# Patient Record
Sex: FEMALE | Race: WHITE | Employment: UNEMPLOYED | URBAN - METROPOLITAN AREA
[De-identification: names, ages, dates, MRNs, and addresses within clinical notes are randomized per-mention and may not be internally consistent; named-entity substitution may affect disease eponyms.]

---

## 2019-03-10 ENCOUNTER — APPOINTMENT (OUTPATIENT)
Dept: MRI IMAGING | Age: 44
End: 2019-03-10
Attending: EMERGENCY MEDICINE
Payer: OTHER GOVERNMENT

## 2019-03-10 ENCOUNTER — APPOINTMENT (OUTPATIENT)
Dept: GENERAL RADIOLOGY | Age: 44
End: 2019-03-10
Attending: EMERGENCY MEDICINE
Payer: OTHER GOVERNMENT

## 2019-03-10 ENCOUNTER — HOSPITAL ENCOUNTER (EMERGENCY)
Age: 44
Discharge: HOME | End: 2019-03-10
Attending: EMERGENCY MEDICINE | Admitting: EMERGENCY MEDICINE
Payer: OTHER GOVERNMENT

## 2019-03-10 ENCOUNTER — APPOINTMENT (OUTPATIENT)
Dept: CT IMAGING | Age: 44
End: 2019-03-10
Attending: EMERGENCY MEDICINE
Payer: OTHER GOVERNMENT

## 2019-03-10 VITALS
HEIGHT: 64 IN | RESPIRATION RATE: 14 BRPM | SYSTOLIC BLOOD PRESSURE: 132 MMHG | TEMPERATURE: 98.4 F | WEIGHT: 155 LBS | OXYGEN SATURATION: 100 % | DIASTOLIC BLOOD PRESSURE: 78 MMHG | BODY MASS INDEX: 26.46 KG/M2 | HEART RATE: 72 BPM

## 2019-03-10 DIAGNOSIS — R29.898 ARM WEAKNESS: ICD-10-CM

## 2019-03-10 DIAGNOSIS — R20.2 PARESTHESIA: Primary | ICD-10-CM

## 2019-03-10 DIAGNOSIS — G93.0 ARACHNOID CYST: ICD-10-CM

## 2019-03-10 LAB
AMPHET UR QL SCN: NEGATIVE
ANION GAP SERPL CALC-SCNC: 7 MMOL/L (ref 3–18)
APPEARANCE UR: CLEAR
APTT PPP: 25.4 SEC (ref 23–36.4)
BARBITURATES UR QL SCN: NEGATIVE
BASOPHILS # BLD: 0 K/UL (ref 0–0.1)
BASOPHILS NFR BLD: 0 % (ref 0–2)
BENZODIAZ UR QL: NEGATIVE
BILIRUB UR QL: NEGATIVE
BUN SERPL-MCNC: 15 MG/DL (ref 7–18)
BUN/CREAT SERPL: 18 (ref 12–20)
CALCIUM SERPL-MCNC: 8.7 MG/DL (ref 8.5–10.1)
CANNABINOIDS UR QL SCN: NEGATIVE
CHLORIDE SERPL-SCNC: 111 MMOL/L (ref 100–108)
CK MB CFR SERPL CALC: 2.3 % (ref 0–4)
CK MB SERPL-MCNC: 2.3 NG/ML (ref 5–25)
CK SERPL-CCNC: 100 U/L (ref 26–192)
CO2 SERPL-SCNC: 22 MMOL/L (ref 21–32)
COCAINE UR QL SCN: NEGATIVE
COLOR UR: YELLOW
CREAT SERPL-MCNC: 0.82 MG/DL (ref 0.6–1.3)
DIFFERENTIAL METHOD BLD: ABNORMAL
EOSINOPHIL # BLD: 0.2 K/UL (ref 0–0.4)
EOSINOPHIL NFR BLD: 2 % (ref 0–5)
ERYTHROCYTE [DISTWIDTH] IN BLOOD BY AUTOMATED COUNT: 12.5 % (ref 11.6–14.5)
ETHANOL SERPL-MCNC: 8 MG/DL (ref 0–3)
GLUCOSE BLD STRIP.AUTO-MCNC: 85 MG/DL (ref 70–110)
GLUCOSE SERPL-MCNC: 86 MG/DL (ref 74–99)
GLUCOSE UR STRIP.AUTO-MCNC: NEGATIVE MG/DL
HCG UR QL: NEGATIVE
HCT VFR BLD AUTO: 38.2 % (ref 35–45)
HDSCOM,HDSCOM: NORMAL
HGB BLD-MCNC: 13.3 G/DL (ref 12–16)
HGB UR QL STRIP: NEGATIVE
INR PPP: 0.9 (ref 0.8–1.2)
KETONES UR QL STRIP.AUTO: NEGATIVE MG/DL
LEUKOCYTE ESTERASE UR QL STRIP.AUTO: NEGATIVE
LYMPHOCYTES # BLD: 2.9 K/UL (ref 0.9–3.6)
LYMPHOCYTES NFR BLD: 34 % (ref 21–52)
MCH RBC QN AUTO: 31.8 PG (ref 24–34)
MCHC RBC AUTO-ENTMCNC: 34.8 G/DL (ref 31–37)
MCV RBC AUTO: 91.4 FL (ref 74–97)
METHADONE UR QL: NEGATIVE
MONOCYTES # BLD: 0.6 K/UL (ref 0.05–1.2)
MONOCYTES NFR BLD: 7 % (ref 3–10)
NEUTS SEG # BLD: 4.8 K/UL (ref 1.8–8)
NEUTS SEG NFR BLD: 57 % (ref 40–73)
NITRITE UR QL STRIP.AUTO: NEGATIVE
OPIATES UR QL: NEGATIVE
PCP UR QL: NEGATIVE
PH UR STRIP: 6 [PH] (ref 5–8)
PLATELET # BLD AUTO: 294 K/UL (ref 135–420)
PMV BLD AUTO: 9 FL (ref 9.2–11.8)
POTASSIUM SERPL-SCNC: 3.9 MMOL/L (ref 3.5–5.5)
PROT UR STRIP-MCNC: NEGATIVE MG/DL
PROTHROMBIN TIME: 12.3 SEC (ref 11.5–15.2)
RBC # BLD AUTO: 4.18 M/UL (ref 4.2–5.3)
SODIUM SERPL-SCNC: 140 MMOL/L (ref 136–145)
SP GR UR REFRACTOMETRY: 1.02 (ref 1–1.03)
TROPONIN I SERPL-MCNC: <0.02 NG/ML (ref 0–0.04)
UROBILINOGEN UR QL STRIP.AUTO: 0.2 EU/DL (ref 0.2–1)
WBC # BLD AUTO: 8.5 K/UL (ref 4.6–13.2)

## 2019-03-10 PROCEDURE — 71045 X-RAY EXAM CHEST 1 VIEW: CPT

## 2019-03-10 PROCEDURE — 96361 HYDRATE IV INFUSION ADD-ON: CPT

## 2019-03-10 PROCEDURE — 82962 GLUCOSE BLOOD TEST: CPT

## 2019-03-10 PROCEDURE — 70450 CT HEAD/BRAIN W/O DYE: CPT

## 2019-03-10 PROCEDURE — 74011250637 HC RX REV CODE- 250/637: Performed by: EMERGENCY MEDICINE

## 2019-03-10 PROCEDURE — 99285 EMERGENCY DEPT VISIT HI MDM: CPT

## 2019-03-10 PROCEDURE — 81003 URINALYSIS AUTO W/O SCOPE: CPT

## 2019-03-10 PROCEDURE — A9575 INJ GADOTERATE MEGLUMI 0.1ML: HCPCS | Performed by: EMERGENCY MEDICINE

## 2019-03-10 PROCEDURE — 80048 BASIC METABOLIC PNL TOTAL CA: CPT

## 2019-03-10 PROCEDURE — 70553 MRI BRAIN STEM W/O & W/DYE: CPT

## 2019-03-10 PROCEDURE — 93005 ELECTROCARDIOGRAM TRACING: CPT

## 2019-03-10 PROCEDURE — 74011636320 HC RX REV CODE- 636/320: Performed by: EMERGENCY MEDICINE

## 2019-03-10 PROCEDURE — 85730 THROMBOPLASTIN TIME PARTIAL: CPT

## 2019-03-10 PROCEDURE — 96360 HYDRATION IV INFUSION INIT: CPT

## 2019-03-10 PROCEDURE — 85610 PROTHROMBIN TIME: CPT

## 2019-03-10 PROCEDURE — 74011250636 HC RX REV CODE- 250/636: Performed by: EMERGENCY MEDICINE

## 2019-03-10 PROCEDURE — 81025 URINE PREGNANCY TEST: CPT

## 2019-03-10 PROCEDURE — 80307 DRUG TEST PRSMV CHEM ANLYZR: CPT

## 2019-03-10 PROCEDURE — 85025 COMPLETE CBC W/AUTO DIFF WBC: CPT

## 2019-03-10 PROCEDURE — 82550 ASSAY OF CK (CPK): CPT

## 2019-03-10 RX ORDER — ARIPIPRAZOLE 15 MG/1
15 TABLET ORAL DAILY
COMMUNITY

## 2019-03-10 RX ORDER — TOPIRAMATE 25 MG/1
75 TABLET ORAL 3 TIMES DAILY
COMMUNITY

## 2019-03-10 RX ORDER — ATORVASTATIN CALCIUM 20 MG/1
40 TABLET, FILM COATED ORAL DAILY
Status: DISCONTINUED | OUTPATIENT
Start: 2019-03-11 | End: 2019-03-10

## 2019-03-10 RX ORDER — ESCITALOPRAM OXALATE 10 MG/1
20 TABLET ORAL DAILY
COMMUNITY

## 2019-03-10 RX ORDER — ASPIRIN 325 MG
325 TABLET ORAL
Status: COMPLETED | OUTPATIENT
Start: 2019-03-10 | End: 2019-03-10

## 2019-03-10 RX ORDER — LAMOTRIGINE 150 MG/1
300 TABLET ORAL DAILY
COMMUNITY

## 2019-03-10 RX ORDER — MODAFINIL 200 MG/1
250 TABLET ORAL DAILY
COMMUNITY

## 2019-03-10 RX ORDER — BUPROPION HYDROCHLORIDE 300 MG/1
300 TABLET ORAL
COMMUNITY

## 2019-03-10 RX ORDER — SODIUM CHLORIDE 9 MG/ML
125 INJECTION, SOLUTION INTRAVENOUS CONTINUOUS
Status: DISCONTINUED | OUTPATIENT
Start: 2019-03-10 | End: 2019-03-10 | Stop reason: HOSPADM

## 2019-03-10 RX ORDER — BUPROPION HYDROCHLORIDE 150 MG/1
150 TABLET ORAL
COMMUNITY

## 2019-03-10 RX ADMIN — GADOTERATE MEGLUMINE 15 ML: 376.9 INJECTION INTRAVENOUS at 17:11

## 2019-03-10 RX ADMIN — ASPIRIN 325 MG ORAL TABLET 325 MG: 325 PILL ORAL at 16:31

## 2019-03-10 RX ADMIN — SODIUM CHLORIDE 125 ML/HR: 900 INJECTION, SOLUTION INTRAVENOUS at 17:31

## 2019-03-10 NOTE — ED PROVIDER NOTES
EMERGENCY DEPARTMENT HISTORY AND PHYSICAL EXAM    Date: 3/10/2019  Patient Name: Bernardo Sewell    History of Presenting Illness     Chief Complaint   Patient presents with    Extremity Weakness         History Provided By: Patient    Chief Complaint: slurred speech, right facial droop  Duration: 1 hour and 40 minutes ago  Timing:  Acute  Location: right face  Modifying Factors: none  Associated Symptoms: generalized paresthesias, weakness    Additional History (Context):   3:11 PM   Bernardo Sewell is a 37 y.o. female who presents to the emergency department C/O slurred speech and left facial droop noted by her  1 hour and 40 minutes ago. Associated sxs include generalized paresthesias, and left sided weakness of left upper and lower extremities. There are no known modifying factors. Pt denies any pain, and any other sxs or complaints. PCP: Keely, MD Marely        Past History     Past Medical History:  Past Medical History:   Diagnosis Date    Bipolar 1 disorder ([facility]     Borderline personality disorder ([facility]     Migraine     Psychiatric disorder        Past Surgical History:  History reviewed. No pertinent surgical history. Family History:  History reviewed. No pertinent family history. Social History:  Social History     Tobacco Use    Smoking status: Never Smoker    Smokeless tobacco: Never Used   Substance Use Topics    Alcohol use: No     Frequency: Never    Drug use: No       Allergies: Allergies   Allergen Reactions    Codeine Nausea and Vomiting    Zithromax [Azithromycin] Other (comments)     \"eye swelling\"         Review of Systems   Review of Systems   Cardiovascular: Negative for chest pain. Gastrointestinal: Negative for abdominal pain. Musculoskeletal: Negative for myalgias. Neurological: Positive for facial asymmetry (right droop), speech difficulty (slurred), weakness (left extremities) and numbness (generalized paresthesias ).    All other systems reviewed and are negative. Physical Exam     Vitals:    03/10/19 1519 03/10/19 1545 03/10/19 1600   BP: 135/84 117/81 132/78   Pulse: 79 72    Resp: 16 14    Temp: 98.4 °F (36.9 °C)     SpO2: 100% 100% 100%   Weight: 70.3 kg (155 lb)     Height: 5' 4\" (1.626 m)       Physical Exam    CONSTITUTIONAL: Alert, well-developed; well-nourished. HEAD:  Normocephalic, atraumatic  EYES: PERRL; EOM's intact. ENTM: Nose: no rhinorrhea; Throat: no erythema or exudate, mucous membranes moist  Neck:  No JVD, supple without lymphadenopathy  RESP: Chest clear, equal breath sounds. CV: S1 and S2 WNL; No murmurs, gallops or rubs. GI: Normal bowel sounds, abdomen soft and non-tender. No masses or organomegaly. : No costo-vertebral angle tenderness. BACK:  Non-tender  UPPER EXT:  Normal inspection  LOWER EXT: No edema, no calf tenderness. Distal pulses intact. NEURO: Reflexes 2/4 and sym, left upper extremity with extensor weakness (+4/5), but flexor strength is +5/5. She has slight left lower extremity weakness (4/5), and her right lower extremity strength is 5/5. CN intact except for slight droop to the corner of the mouth on the right. Sensation intact. SKIN: normal for age and stage. PSYCH:  Alert and oriented, normal affect. Diagnostic Study Results     Labs -     Recent Results (from the past 12 hour(s))   GLUCOSE, POC    Collection Time: 03/10/19  3:15 PM   Result Value Ref Range    Glucose (POC) 85 70 - 110 mg/dL   CBC WITH AUTOMATED DIFF    Collection Time: 03/10/19  3:25 PM   Result Value Ref Range    WBC 8.5 4.6 - 13.2 K/uL    RBC 4.18 (L) 4.20 - 5.30 M/uL    HGB 13.3 12.0 - 16.0 g/dL    HCT 38.2 35.0 - 45.0 %    MCV 91.4 74.0 - 97.0 FL    MCH 31.8 24.0 - 34.0 PG    MCHC 34.8 31.0 - 37.0 g/dL    RDW 12.5 11.6 - 14.5 %    PLATELET 238 903 - 914 K/uL    MPV 9.0 (L) 9.2 - 11.8 FL    NEUTROPHILS 57 40 - 73 %    LYMPHOCYTES 34 21 - 52 %    MONOCYTES 7 3 - 10 %    EOSINOPHILS 2 0 - 5 %    BASOPHILS 0 0 - 2 %    ABS. NEUTROPHILS 4.8 1.8 - 8.0 K/UL    ABS. LYMPHOCYTES 2.9 0.9 - 3.6 K/UL    ABS. MONOCYTES 0.6 0.05 - 1.2 K/UL    ABS. EOSINOPHILS 0.2 0.0 - 0.4 K/UL    ABS.  BASOPHILS 0.0 0.0 - 0.1 K/UL    DF AUTOMATED     PROTHROMBIN TIME + INR    Collection Time: 03/10/19  3:25 PM   Result Value Ref Range    Prothrombin time 12.3 11.5 - 15.2 sec    INR 0.9 0.8 - 1.2     PTT    Collection Time: 03/10/19  3:25 PM   Result Value Ref Range    aPTT 25.4 23.0 - 36.4 SEC   CARDIAC PANEL,(CK, CKMB & TROPONIN)    Collection Time: 03/10/19  3:25 PM   Result Value Ref Range     26 - 192 U/L    CK - MB 2.3 <3.6 ng/ml    CK-MB Index 2.3 0.0 - 4.0 %    Troponin-I, QT <0.02 0.0 - 0.408 NG/ML   METABOLIC PANEL, BASIC    Collection Time: 03/10/19  3:25 PM   Result Value Ref Range    Sodium 140 136 - 145 mmol/L    Potassium 3.9 3.5 - 5.5 mmol/L    Chloride 111 (H) 100 - 108 mmol/L    CO2 22 21 - 32 mmol/L    Anion gap 7 3.0 - 18 mmol/L    Glucose 86 74 - 99 mg/dL    BUN 15 7.0 - 18 MG/DL    Creatinine 0.82 0.6 - 1.3 MG/DL    BUN/Creatinine ratio 18 12 - 20      GFR est AA >60 >60 ml/min/1.73m2    GFR est non-AA >60 >60 ml/min/1.73m2    Calcium 8.7 8.5 - 10.1 MG/DL   ETHYL ALCOHOL    Collection Time: 03/10/19  3:25 PM   Result Value Ref Range    ALCOHOL(ETHYL),SERUM 8 (H) 0 - 3 MG/DL   EKG, 12 LEAD, INITIAL    Collection Time: 03/10/19  3:49 PM   Result Value Ref Range    Ventricular Rate 69 BPM    Atrial Rate 69 BPM    P-R Interval 144 ms    QRS Duration 90 ms    Q-T Interval 402 ms    QTC Calculation (Bezet) 430 ms    Calculated P Axis 60 degrees    Calculated R Axis 54 degrees    Calculated T Axis 32 degrees    Diagnosis       Normal sinus rhythm  Normal ECG  No previous ECGs available     URINALYSIS W/ RFLX MICROSCOPIC    Collection Time: 03/10/19  3:55 PM   Result Value Ref Range    Color YELLOW      Appearance CLEAR      Specific gravity 1.019 1.005 - 1.030      pH (UA) 6.0 5.0 - 8.0      Protein NEGATIVE  NEG mg/dL    Glucose NEGATIVE NEG mg/dL    Ketone NEGATIVE  NEG mg/dL    Bilirubin NEGATIVE  NEG      Blood NEGATIVE  NEG      Urobilinogen 0.2 0.2 - 1.0 EU/dL    Nitrites NEGATIVE  NEG      Leukocyte Esterase NEGATIVE  NEG     DRUG SCREEN, URINE    Collection Time: 03/10/19  3:55 PM   Result Value Ref Range    BENZODIAZEPINES NEGATIVE  NEG      BARBITURATES NEGATIVE  NEG      THC (TH-CANNABINOL) NEGATIVE  NEG      OPIATES NEGATIVE  NEG      PCP(PHENCYCLIDINE) NEGATIVE  NEG      COCAINE NEGATIVE  NEG      AMPHETAMINES NEGATIVE  NEG      METHADONE NEGATIVE  NEG      HDSCOM (NOTE)    HCG URINE, QL    Collection Time: 03/10/19  3:55 PM   Result Value Ref Range    HCG urine, QL NEGATIVE  NEG         Radiologic Studies -     7:01 PM  RADIOLOGY FINDINGS  Chest X-ray shows no acute process  Pending review by Radiologist  Recorded by Jose David Stern ED Scribe, as dictated by Keturah Castillo MD     MRI BRAIN W WO CONT   Final Result   IMPRESSION:      1. No evidence of acute infarct or other acute or significant intracranial   finding. 2. Incidental small supracerebellar arachnoid cyst.      As read by the radiologist.    CT HEAD WO CONT   Final Result   IMPRESSION:      No acute intracranial abnormalities. Critical result called to  Physician: Bri Nuno at 3:39 PM on March   10, 2019. This result was acknowledged and understood. As read by the radiologist.    XR CHEST PORT    (Results Pending)     CT Results  (Last 48 hours)               03/10/19 1532  CT HEAD WO CONT Final result    Impression:  IMPRESSION:       No acute intracranial abnormalities. Critical result called to Dr. Byrd: Bri Nuno at 3:39 PM on March   10, 2019. This result was acknowledged and understood. Narrative:  EXAM: CT head       INDICATION: Left facial droop and left arm weakness       COMPARISON: None.        TECHNIQUE: Axial CT imaging of the head was performed without intravenous   contrast. One or more dose reduction techniques were used on this CT: automated   exposure control, adjustment of the mAs and/or kVp according to patient size,   and iterative reconstruction techniques. The specific techniques used on this   CT exam have been documented in the patient's electronic medical record. Digital   Imaging and Communications in Medicine (DICOM) format image data are available   to nonaffiliated external healthcare facilities or entities on a secure, media   free, reciprocally searchable basis with patient authorization for at least a   12-month period after this study. _______________       FINDINGS:       BRAIN AND POSTERIOR FOSSA: The sulci, folia, ventricles and basal cisterns are   within normal limits for the patient's age. There is no intracranial hemorrhage,   mass effect, or midline shift. There are no areas of abnormal parenchymal   attenuation. EXTRA-AXIAL SPACES AND MENINGES: There are no abnormal extra-axial fluid   collections. CALVARIUM: Intact. SINUSES: Clear. OTHER: None.       _______________               CXR Results  (Last 48 hours)    None          Medications given in the ED-  Medications   0.9% sodium chloride infusion (125 mL/hr IntraVENous New Bag 3/10/19 1731)   aspirin tablet 325 mg (325 mg Oral Given 3/10/19 1631)   gadoterate meglumine (DOTAREM) 0.5 mmol/mL contrast solution syringe 15 mL (15 mL IntraVENous Given 3/10/19 1711)         Medical Decision Making   I am the first provider for this patient. I reviewed the vital signs, available nursing notes, past medical history, past surgical history, family history and social history. Vital Signs-Reviewed the patient's vital signs. Pulse Oximetry Analysis - 100% on room air     ,EKG interpretation: (Preliminary)  3:49 PM   NSR. Rate 69 bpm. QRS duration 90 ms. No STEMI.   EKG read by Becki Jordan MD    Records Reviewed: Nursing Notes    Provider Notes (Medical Decision Making): Ddx: CVA, TIA, complex migraine, medication effect, ethanol intoxication, withdrawal, drug abuse    Procedures:  Procedures    ED Course:   3:11 PM Initial assessment performed. Code S protocol called and initiated. 3:17 PM Discussed case and examination with Jose Colby MD, teleneurology, who will have Cheryl Jones MD see the patient via [facility] after the patient returns from [facility].    3:40 PM Spoke to Cheryl Jones MD who has evaluated the patient. He does not recommend tPA as this could be a seizure or a complex migraine. He does recommend admission, MRI, and giving Aspirin, fluids, and Lipitor. 4:08 PM Discussed patient's history, exam, CT, and teleneurology evaluation with Jessica Gomez MD, neurology, who agrees that the patient's symptoms may be due to complex migraine, or psych. He recommends ordering an MRI, and discharge home if this is negative. Diagnosis and Disposition     Discussion: Patient presents with extremity paresthesias and weakness, and a facial droop which resolved immediately after CT. She was evaluated by teleneurology andTPA was not reccommended. MRI was recommended. Also discussed with local neurology who does not feel this was stroke or TIA and if MRI normal, the patient can be discharged home and follow up as an oupatient. The patient's  MRI was negative and her symptoms are completely resolved. Will discharge home. Patient agrees with this plan. DISCHARGE NOTE:  7:02 PM   Brayan Fall  results have been reviewed with her. She has been counseled regarding her diagnosis, treatment, and plan. She verbally conveys understanding and agreement of the signs, symptoms, diagnosis, treatment and prognosis and additionally agrees to follow up as discussed. She also agrees with the care-plan and conveys that all of her questions have been answered.   I have also provided discharge instructions for her that include: educational information regarding their diagnosis and treatment, and list of reasons why they would want to return to the ED prior to their follow-up appointment, should her condition change. She has been provided with education for proper emergency department utilization. CLINICAL IMPRESSION:    1. Paresthesia    2. Arm weakness    3. Arachnoid cyst        PLAN:  1. D/C Home  2. Current Discharge Medication List        3. Follow-up Information     Follow up With Specialties Details Why Contact Info    Zack Carr MD Neurology Schedule an appointment as soon as possible for a visit in 2 days For neurology follow up [address] [place] [address]  [phone]      [facility] EMERGENCY DEPT Emergency Medicine  As needed, If symptoms worsen [address] [place] [address]  [phone]        _______________________________    Attestations: This note is prepared by Pedro Pablo Nettles, acting as Scribe for Luz Aviles MD.    Luz Aviles MD:  The scribe's documentation has been prepared under my direction and personally reviewed by me in its entirety.   I confirm that the note above accurately reflects all work, treatment, procedures, and medical decision making performed by me.  _______________________________

## 2019-03-10 NOTE — DISCHARGE INSTRUCTIONS
Numbness and Tingling: Care Instructions  Your Care Instructions    Many things can cause numbness or tingling. Swelling may put pressure on a nerve. This could cause you to lose feeling or have a pins-and-needles sensation on part of your body. Nerves may be damaged from trauma, toxins, or diseases, such as diabetes or multiple sclerosis (MS). Sometimes, though, the cause is not clear. If there is no clear reason for your symptoms, and you are not having any other symptoms, your doctor may suggest watching and waiting for a while to see if the numbness or tingling goes away on its own. Your doctor may want you to have blood or nerve tests to find the cause of your symptoms. Follow-up care is a key part of your treatment and safety. Be sure to make and go to all appointments, and call your doctor if you are having problems. It's also a good idea to know your test results and keep a list of the medicines you take. How can you care for yourself at home? · If your doctor prescribes medicine, take it exactly as directed. Call your doctor if you think you are having a problem with your medicine. · If you have any swelling, put ice or a cold pack on the area for 10 to 20 minutes at a time. Put a thin cloth between the ice and your skin. When should you call for help? Call 911 anytime you think you may need emergency care. For example, call if:    · You have weakness, numbness, or tingling in both legs.     · You lose bowel or bladder control.     · You have symptoms of a stroke. These may include:  ? Sudden numbness, tingling, weakness, or loss of movement in your face, arm, or leg, especially on only one side of your body. ? Sudden vision changes. ? Sudden trouble speaking. ? Sudden confusion or trouble understanding simple statements. ? Sudden problems with walking or balance.   ? A sudden, severe headache that is different from past headaches.    Watch closely for changes in your health, and be sure to contact your doctor if you have any problems, or if:    · You do not get better as expected. Where can you learn more? Go to http://elsa-fredi.info/. Enter M831 in the search box to learn more about \"Numbness and Tingling: Care Instructions. \"  Current as of: Syl 3, 2018  Content Version: 11.9  © 6026-3122 [employer]. Care instructions adapted under license by [employer] (which disclaims liability or warranty for this information). If you have questions about a medical condition or this instruction, always ask your healthcare professional. [place] [address] any warranty or liability for your use of this information.

## 2019-03-10 NOTE — ED TRIAGE NOTES
Pt states she was talking with  around 1330  noted that pt had facial droop and was slurring her words. Pt states she has some numbness on lt side of face. Pt has slight lt arm weakness.  Pt is A&O on arrival. Pt states she is anxious

## 2019-05-14 LAB
ATRIAL RATE: 69 BPM
CALCULATED P AXIS, ECG09: 60 DEGREES
CALCULATED R AXIS, ECG10: 54 DEGREES
CALCULATED T AXIS, ECG11: 32 DEGREES
DIAGNOSIS, 93000: NORMAL
P-R INTERVAL, ECG05: 144 MS
Q-T INTERVAL, ECG07: 402 MS
QRS DURATION, ECG06: 90 MS
QTC CALCULATION (BEZET), ECG08: 430 MS
VENTRICULAR RATE, ECG03: 69 BPM

## 2020-11-18 ENCOUNTER — TRANSCRIBE ORDER (OUTPATIENT)
Dept: SCHEDULING | Age: 45
End: 2020-11-18

## 2020-11-18 DIAGNOSIS — M54.2 NECK PAIN: Primary | ICD-10-CM
